# Patient Record
Sex: FEMALE | Race: BLACK OR AFRICAN AMERICAN
[De-identification: names, ages, dates, MRNs, and addresses within clinical notes are randomized per-mention and may not be internally consistent; named-entity substitution may affect disease eponyms.]

---

## 2019-04-22 ENCOUNTER — HOSPITAL ENCOUNTER (EMERGENCY)
Dept: HOSPITAL 25 - UCCORT | Age: 44
Discharge: HOME | End: 2019-04-22
Payer: COMMERCIAL

## 2019-04-22 VITALS — DIASTOLIC BLOOD PRESSURE: 77 MMHG | SYSTOLIC BLOOD PRESSURE: 127 MMHG

## 2019-04-22 DIAGNOSIS — S46.912A: Primary | ICD-10-CM

## 2019-04-22 DIAGNOSIS — F17.210: ICD-10-CM
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
## 2019-04-22 DIAGNOSIS — Z88.0: ICD-10-CM

## 2019-04-22 PROCEDURE — 99201: CPT

## 2019-04-22 PROCEDURE — G0463 HOSPITAL OUTPT CLINIC VISIT: HCPCS

## 2019-04-22 PROCEDURE — 71046 X-RAY EXAM CHEST 2 VIEWS: CPT

## 2019-04-22 NOTE — UC
Shoulder Pain HPI





- HPI Summary


HPI Summary: 





44 -year-old female with a sudden onset of sharp left shoulder and left upper 

chest pain earlier this afternoon.  She denies any radiation of pain, no nausea

, no diaphoresis.  She states her father did have a heart attack before the age 

of 50.  Her shoulder hurts more with movement and range of motion is limited 

due to pain.





- History of Current Complaint


Chief Complaint: UCUpperExtremity


Stated Complaint: LT SHOULDER INJURY


Time Seen by Provider: 19 20:29


Hx Obtained From: Patient


Hx Last Menstrual Period: 3/30/19


Pregnant?: No


Onset/Duration: Sudden Onset - Sudden onset earlier this afternoon.


Timing: Constant


Severity Initially: Moderate


Severity Currently: Mild


Pain Intensity: 7


Character: Sharp


Aggravating Factor(s): Movement


Alleviating Factor(s): Nothing


Associated Signs And Symptoms: Positive: Negative





- Allergies/Home Medications


Allergies/Adverse Reactions: 


 Allergies











Allergy/AdvReac Type Severity Reaction Status Date / Time


 


Penicillins Allergy  Hives Verified 19 19:58











Home Medications: 


 Home Medications





Acetaminophen [Extra Strength Non-Aspirin] 1,000 mg PO Q6H PRN 19 [

History Confirmed 19]


Acetaminophen/Pamabrom [Midol Caplet] 2 each PO DAILY PRN 19 [History 

Confirmed 19]


Lansoprazole [Prevacid] 15 mg PO BEDTIME PRN 19 [History Confirmed ]











PMH/Surg Hx/FS Hx/Imm Hx


Previously Healthy: Yes





- Surgical History


Surgical History: Yes


Surgery Procedure, Year, and Place: LEEP x2.  .  appendectomy





- Family History


Known Family History: Positive: Cardiac Disease - Father had a heart attack 

before the age of 50





- Social History


Alcohol Use: None


Substance Use Type: None


Smoking Status (MU): Current Every Day Smoker


Type: Cigarettes


Amount Used/How Often: 1 PPD


Length of Time of Smoking/Using Tobacco: 20 years


Have You Smoked in the Last Year: Yes





Review of Systems


All Other Systems Reviewed And Are Negative: Yes


Motor: Positive: Decreased ROM


Neurovascular: Positive: Negative


Musculoskeletal: Positive: Decreased ROM - Pain with range of motion of her 

left shoulder.


Neurological: Positive: Negative


Is Patient Immunocompromised?: No





Physical Exam


Triage Information Reviewed: Yes


Appearance: Well-Appearing, No Pain Distress, Well-Nourished


Vital Signs: 


 Initial Vital Signs











Temp  97.4 F   19 20:03


 


Pulse  83   19 20:03


 


Resp  24   19 20:03


 


BP  127/77   19 20:03


 


Pulse Ox  100   19 20:03











Vital Signs Reviewed: Yes


Respiratory: Positive: Lungs clear, Normal breath sounds, No respiratory 

distress, No accessory muscle use - Tenderness on palpation left upper chest.


Cardiovascular: Positive: RRR, No Murmur, Pulses Normal, Brisk Capillary Refill


Musculoskeletal: Positive: Strength Intact, ROM Limited @ - Range of motion of 

left arm and shoulder limited due to pain.  She is able to abduct her left arm 

approximate 90.


Neurological: Positive: Alert, Muscle Tone Normal


Psychological Exam: Normal


Skin Exam: Normal





Shoulder Course/Dx





- Course


Course Of Treatment: 





Chest x-ray: Negative, Left shoulder x-ray: Negative I believe this is more of 

a muscle strain.  She is to apply heat to the sore areas, take Motrin for pain 

and avoid movements that cause pain.  No work tomorrow.  I did stress to her if 

she develops any radiation of pain, diaphoresis, nausea radiating up into her 

jaw she is to call 911 and go to the emergency room.  I do not feel this is 

cardiac in nature.  Prior to discharge the patient told me that she works as a 

 however the past few days she has had to do the cleaning 

herself therefore I think this lends itself to being more of a muscular strain.

  If there is any change in the x-ray report we will give her a call.





- Differential Dx/Diagnosis


Provider Diagnosis: 


 Shoulder strain








Discharge





- Sign-Out/Discharge


Documenting (check all that apply): Patient Departure


All imaging exams completed and their final reports reviewed: No





- Discharge Plan


Condition: Fair


Disposition: HOME


Patient Education Materials:  Shoulder Pain (ED)


Forms:  *Work Release


Referrals: 


Dash Bae MD [Primary Care Provider] - 


Additional Instructions: 


Avoid movements that cause pain, you may apply heat to the sore areas, take 

Motrin every 8 hours for pain.  Definite follow-up with your primary care 

provider or an orthopedist if no improvement in one or 2 days.  The x-rays as 

interpreted by myself and Dr. Daugherty were negative for fracture however the 

radiologist will review those in the morning and we will contact you if there 

is any change in interpretation.  If you have any worsening chest pain, sweating

, nausea, radiation of pain up into her neck or down her arms, shortness of 

breath, you are to go immediately to the emergency room or call 911.





- Billing Disposition and Condition


Condition: FAIR


Disposition: Home





- Attestation Statements


Provider Attestation: 





Per institutional requirements, I have reviewed the chart, however, I was not 

consulted specifically or made aware of this patient by the  midlevel provider.

  I did not personally evaluate, interact with , or disposition  this patient.

## 2019-04-23 NOTE — UC
- Progress Note


Progress Note: 





chest xray report : no acute disease 


left shoulder xray report : IMPRESSION: No fracture of the left shoulder is 

noted.








Course/Dx





- Diagnoses


Provider Diagnoses: 


 Shoulder strain








Discharge





- Sign-Out/Discharge


Documenting (check all that apply): Patient Departure


All imaging exams completed and their final reports reviewed: Yes





- Discharge Plan


Condition: Fair


Disposition: HOME


Patient Education Materials:  Shoulder Pain (ED)


Forms:  *Work Release


Referrals: 


Dash Bae MD [Primary Care Provider] - 


Additional Instructions: 


Avoid movements that cause pain, you may apply heat to the sore areas, take 

Motrin every 8 hours for pain.  Definite follow-up with your primary care 

provider or an orthopedist if no improvement in one or 2 days.  The x-rays as 

interpreted by myself and Dr. Daugherty were negative for fracture however the 

radiologist will review those in the morning and we will contact you if there 

is any change in interpretation.  If you have any worsening chest pain, sweating

, nausea, radiation of pain up into her neck or down her arms, shortness of 

breath, you are to go immediately to the emergency room or call 911.





- Billing Disposition and Condition


Condition: FAIR


Disposition: Home